# Patient Record
Sex: MALE | Race: WHITE
[De-identification: names, ages, dates, MRNs, and addresses within clinical notes are randomized per-mention and may not be internally consistent; named-entity substitution may affect disease eponyms.]

---

## 2020-08-19 ENCOUNTER — HOSPITAL ENCOUNTER (INPATIENT)
Dept: HOSPITAL 74 - YASAS | Age: 55
LOS: 5 days | Discharge: HOME | End: 2020-08-24
Attending: ALLERGY & IMMUNOLOGY | Admitting: ALLERGY & IMMUNOLOGY
Payer: COMMERCIAL

## 2020-08-19 VITALS — BODY MASS INDEX: 22 KG/M2

## 2020-08-19 DIAGNOSIS — Z86.79: ICD-10-CM

## 2020-08-19 DIAGNOSIS — F19.282: ICD-10-CM

## 2020-08-19 DIAGNOSIS — F41.9: ICD-10-CM

## 2020-08-19 DIAGNOSIS — G89.29: ICD-10-CM

## 2020-08-19 DIAGNOSIS — Z87.442: ICD-10-CM

## 2020-08-19 DIAGNOSIS — Z98.890: ICD-10-CM

## 2020-08-19 DIAGNOSIS — F17.213: ICD-10-CM

## 2020-08-19 DIAGNOSIS — M54.89: ICD-10-CM

## 2020-08-19 DIAGNOSIS — F32.9: ICD-10-CM

## 2020-08-19 DIAGNOSIS — F14.20: ICD-10-CM

## 2020-08-19 DIAGNOSIS — F10.230: Primary | ICD-10-CM

## 2020-08-19 DIAGNOSIS — F19.24: ICD-10-CM

## 2020-08-19 PROCEDURE — HZ2ZZZZ DETOXIFICATION SERVICES FOR SUBSTANCE ABUSE TREATMENT: ICD-10-PCS | Performed by: ALLERGY & IMMUNOLOGY

## 2020-08-19 PROCEDURE — U0003 INFECTIOUS AGENT DETECTION BY NUCLEIC ACID (DNA OR RNA); SEVERE ACUTE RESPIRATORY SYNDROME CORONAVIRUS 2 (SARS-COV-2) (CORONAVIRUS DISEASE [COVID-19]), AMPLIFIED PROBE TECHNIQUE, MAKING USE OF HIGH THROUGHPUT TECHNOLOGIES AS DESCRIBED BY CMS-2020-01-R: HCPCS

## 2020-08-19 RX ADMIN — Medication SCH MG: at 22:22

## 2020-08-19 RX ADMIN — HYDROXYZINE PAMOATE SCH MG: 25 CAPSULE ORAL at 17:45

## 2020-08-19 RX ADMIN — NICOTINE SCH MG: 14 PATCH, EXTENDED RELEASE TRANSDERMAL at 17:39

## 2020-08-19 RX ADMIN — HYDROXYZINE PAMOATE SCH MG: 25 CAPSULE ORAL at 22:22
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
## 2020-08-20 LAB
ALBUMIN SERPL-MCNC: 3.9 G/DL (ref 3.4–5)
ALP SERPL-CCNC: 72 U/L (ref 45–117)
ALT SERPL-CCNC: 86 U/L (ref 13–61)
ANION GAP SERPL CALC-SCNC: 9 MMOL/L (ref 8–16)
AST SERPL-CCNC: 73 U/L (ref 15–37)
BILIRUB SERPL-MCNC: 1.3 MG/DL (ref 0.2–1)
BUN SERPL-MCNC: 10.7 MG/DL (ref 7–18)
CALCIUM SERPL-MCNC: 9.3 MG/DL (ref 8.5–10.1)
CHLORIDE SERPL-SCNC: 102 MMOL/L (ref 98–107)
CO2 SERPL-SCNC: 27 MMOL/L (ref 21–32)
CREAT SERPL-MCNC: 0.8 MG/DL (ref 0.55–1.3)
DEPRECATED RDW RBC AUTO: 13.2 % (ref 11.9–15.9)
GLUCOSE SERPL-MCNC: 121 MG/DL (ref 74–106)
HCT VFR BLD CALC: 41.7 % (ref 35.4–49)
HGB BLD-MCNC: 14.1 GM/DL (ref 11.7–16.9)
MCH RBC QN AUTO: 33.2 PG (ref 25.7–33.7)
MCHC RBC AUTO-ENTMCNC: 34 G/DL (ref 32–35.9)
MCV RBC: 97.7 FL (ref 80–96)
PLATELET # BLD AUTO: 116 K/MM3 (ref 134–434)
PMV BLD: 8.7 FL (ref 7.5–11.1)
POTASSIUM SERPLBLD-SCNC: 3.6 MMOL/L (ref 3.5–5.1)
PROT SERPL-MCNC: 7.5 G/DL (ref 6.4–8.2)
RBC # BLD AUTO: 4.26 M/MM3 (ref 4–5.6)
SODIUM SERPL-SCNC: 138 MMOL/L (ref 136–145)
WBC # BLD AUTO: 5.6 K/MM3 (ref 4–10)

## 2020-08-20 RX ADMIN — Medication SCH MG: at 22:14

## 2020-08-20 RX ADMIN — HYDROXYZINE PAMOATE SCH MG: 25 CAPSULE ORAL at 06:17

## 2020-08-20 RX ADMIN — NICOTINE SCH MG: 14 PATCH, EXTENDED RELEASE TRANSDERMAL at 10:07

## 2020-08-20 RX ADMIN — TRAZODONE HYDROCHLORIDE SCH MG: 100 TABLET ORAL at 22:14

## 2020-08-20 RX ADMIN — HYDROXYZINE PAMOATE SCH: 25 CAPSULE ORAL at 13:24

## 2020-08-20 RX ADMIN — HYDROXYZINE PAMOATE SCH MG: 25 CAPSULE ORAL at 10:06

## 2020-08-20 RX ADMIN — Medication SCH TAB: at 10:06

## 2020-08-20 RX ADMIN — HYDROXYZINE PAMOATE SCH MG: 25 CAPSULE ORAL at 17:38

## 2020-08-20 RX ADMIN — HYDROXYZINE PAMOATE SCH MG: 25 CAPSULE ORAL at 22:14

## 2020-08-20 NOTE — PN
S CIWA





- CIWA Score


Nausea/Vomitin-Mild Nausea/No Vomiting


Muscle Tremors: 3


Anxiety: 2


Agitation: 1-Slight > Activity


Paroxysmal Sweats: 1-Minimal Palms Moist


Orientation: 0-Oriented


Tacttile Disturbances: 0-None


Auditory Disturbances: 0-None


Visual Disturbances: 2-Mild Sensitivity


Headache: 0-None Present


CIWA-Ar Total Score: 10





[facility] Progress Note (SOAP)


Subjective: 





54 years old male was admitted on 20 for alcohol withdrawal sx management


treating with librium detox regiment





feels tired ate 30% of breakfast and 40% lunch


bmi 22


ensure 120 ml po tid with meals


Objective: 





20 12:52


                               Vital Signs - 24 hr











  20





  14:57 17:28 20:33


 


Temperature 97.7 F 97.3 F L 97.3 F L


 


Pulse Rate 84 81 84


 


Respiratory 19 18 18





Rate   


 


Blood Pressure 141/88 161/86 139/91


 


O2 Sat by Pulse   100





Oximetry (%)   














  20





  06:43 08:31


 


Temperature 97.1 F L 98.4 F


 


Pulse Rate 104 H 82


 


Respiratory 16 18





Rate  


 


Blood Pressure 119/83 118/80


 


O2 Sat by Pulse 97 





Oximetry (%)  








                                Laboratory Tests











  20





  08:00 08:00 08:00


 


WBC   5.6 


 


RBC   4.26 


 


Hgb   14.1 


 


Hct   41.7 


 


MCV   97.7 H 


 


MCH   33.2 


 


MCHC   34.0 


 


RDW   13.2 


 


Plt Count   116 L 


 


MPV   8.7 


 


Sodium    138


 


Potassium    3.6


 


Chloride    102


 


Carbon Dioxide    27


 


Anion Gap    9


 


BUN    10.7


 


Creatinine    0.8


 


Est GFR (CKD-EPI)AfAm    118.20


 


Est GFR (CKD-EPI)NonAf    101.99


 


Random Glucose    121 H


 


Calcium    9.3


 


Total Bilirubin    1.3 H


 


AST    73 H


 


ALT    86 H


 


Alkaline Phosphatase    72


 


Total Protein    7.5


 


Albumin    3.9


 


Syphilis Serology  Non-reactive  











20 12:56


glucose elevation 


fasting glucose pending


Assessment: 





20 12:56


alcohol withdrawal 


Plan: 





librium regiment
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
## 2020-08-20 NOTE — EKG
Test Reason : 

Blood Pressure : ***/*** mmHG

Vent. Rate : 073 BPM     Atrial Rate : 073 BPM

   P-R Int : 162 ms          QRS Dur : 084 ms

    QT Int : 378 ms       P-R-T Axes : 067 042 051 degrees

   QTc Int : 416 ms

 

NORMAL SINUS RHYTHM

POSSIBLE LEFT ATRIAL ENLARGEMENT

BORDERLINE ECG

NO PREVIOUS ECGS AVAILABLE

Confirmed by GERALD NUÑEZ, LISSA ([id number]) on 8/20/2020 4:58:09 PM

 

Referred By:             Confirmed By:LISSA DUARTE MD

## 2020-08-21 RX ADMIN — HYDROXYZINE PAMOATE SCH MG: 25 CAPSULE ORAL at 05:57

## 2020-08-21 RX ADMIN — HYDROXYZINE PAMOATE SCH: 25 CAPSULE ORAL at 14:28

## 2020-08-21 RX ADMIN — TRAZODONE HYDROCHLORIDE SCH MG: 100 TABLET ORAL at 22:19

## 2020-08-21 RX ADMIN — Medication SCH MG: at 22:19

## 2020-08-21 RX ADMIN — HYDROXYZINE PAMOATE SCH MG: 25 CAPSULE ORAL at 10:53

## 2020-08-21 RX ADMIN — HYDROXYZINE PAMOATE SCH MG: 25 CAPSULE ORAL at 22:19

## 2020-08-21 RX ADMIN — NICOTINE SCH MG: 14 PATCH, EXTENDED RELEASE TRANSDERMAL at 10:55

## 2020-08-21 RX ADMIN — HYDROXYZINE PAMOATE SCH MG: 25 CAPSULE ORAL at 17:51

## 2020-08-21 RX ADMIN — Medication SCH TAB: at 10:54

## 2020-08-22 RX ADMIN — HYDROXYZINE PAMOATE SCH MG: 25 CAPSULE ORAL at 05:44

## 2020-08-22 RX ADMIN — HYDROXYZINE PAMOATE SCH MG: 25 CAPSULE ORAL at 10:11

## 2020-08-22 RX ADMIN — Medication SCH TAB: at 10:10

## 2020-08-22 RX ADMIN — TRAZODONE HYDROCHLORIDE SCH MG: 100 TABLET ORAL at 22:28

## 2020-08-22 RX ADMIN — HYDROXYZINE PAMOATE SCH MG: 25 CAPSULE ORAL at 18:59

## 2020-08-22 RX ADMIN — NICOTINE SCH: 14 PATCH, EXTENDED RELEASE TRANSDERMAL at 10:12

## 2020-08-22 RX ADMIN — HYDROXYZINE PAMOATE SCH MG: 25 CAPSULE ORAL at 22:28

## 2020-08-22 RX ADMIN — Medication SCH MG: at 22:28

## 2020-08-22 RX ADMIN — HYDROXYZINE PAMOATE SCH: 25 CAPSULE ORAL at 14:52

## 2020-08-22 NOTE — PN
S CIWA





- CIWA Score


Nausea/Vomitin-No Nausea/No Vomiting


Muscle Tremors: None


Anxiety: 2


Agitation: 0-Normal Activity


Paroxysmal Sweats: 2


Orientation: 0-Oriented


Tacttile Disturbances: 0-None


Auditory Disturbances: 0-None


Visual Disturbances: 0-None


Headache: 2-Mild


CIWA-Ar Total Score: 6





[facility] Progress Note (SOAP)


Subjective: 





c/o headache, anxiety, and headache.


Objective: 





20 12:40


                                   Vital Signs











  20





  06:45 08:34


 


Temperature 98.1 F 97.3 F L


 


Pulse Rate 61 88


 


Respiratory 18 18





Rate  


 


Blood Pressure 98/58 L 98/62


 


O2 Sat by Pulse 99 





Oximetry (%)  








                             Laboratory Last Values











WBC  5.6 K/mm3 (4.0-10.0)   20  08:00    


 


RBC  4.26 M/mm3 (4.00-5.60)   20  08:00    


 


Hgb  14.1 GM/dL (11.7-16.9)   20  08:00    


 


Hct  41.7 % (35.4-49)   20  08:00    


 


MCV  97.7 fl (80-96)  H  20  08:00    


 


MCH  33.2 pg (25.7-33.7)   20  08:00    


 


MCHC  34.0 g/dl (32.0-35.9)   20  08:00    


 


RDW  13.2 % (11.9-15.9)   20  08:00    


 


Plt Count  116 K/MM3 (134-434)  L  20  08:00    


 


MPV  8.7 fl (7.5-11.1)   20  08:00    


 


Sodium  138 mmol/L (136-145)   20  08:00    


 


Potassium  3.6 mmol/L (3.5-5.1)   20  08:00    


 


Chloride  102 mmol/L ()   20  08:00    


 


Carbon Dioxide  27 mmol/L (21-32)   20  08:00    


 


Anion Gap  9 MMOL/L (8-16)   20  08:00    


 


BUN  10.7 mg/dL (7-18)   20  08:00    


 


Creatinine  0.8 mg/dL (0.55-1.3)   20  08:00    


 


Est GFR (CKD-EPI)AfAm  118.20   20  08:00    


 


Est GFR (CKD-EPI)NonAf  101.99   20  08:00    


 


Random Glucose  121 mg/dL ()  H  20  08:00    


 


Calcium  9.3 mg/dL (8.5-10.1)   20  08:00    


 


Total Bilirubin  1.3 mg/dL (0.2-1)  H  20  08:00    


 


AST  73 U/L (15-37)  H  20  08:00    


 


ALT  86 U/L (13-61)  H  20  08:00    


 


Alkaline Phosphatase  72 U/L ()   20  08:00    


 


Total Protein  7.5 g/dl (6.4-8.2)   20  08:00    


 


Albumin  3.9 g/dl (3.4-5.0)   20  08:00    


 


Syphilis Serology  Non-reactive  (NONREACTIVE)   20  08:00    


 


COVID-19 (JONATHAN)  Not detected  (Not Detected)   20  08:40    








Labs noted.


Assessment: 





20 12:40


AOX3, in no acute respiratory distress.


Full ROM, ambulating in the unit.


Withdrawal symptoms.


Plan: 





continue detox.

## 2020-08-23 RX ADMIN — Medication SCH MG: at 22:04

## 2020-08-23 RX ADMIN — HYDROXYZINE PAMOATE SCH MG: 25 CAPSULE ORAL at 17:22

## 2020-08-23 RX ADMIN — HYDROXYZINE PAMOATE SCH: 25 CAPSULE ORAL at 13:35

## 2020-08-23 RX ADMIN — Medication SCH MG: at 22:05

## 2020-08-23 RX ADMIN — TRAZODONE HYDROCHLORIDE SCH MG: 100 TABLET ORAL at 22:04

## 2020-08-23 RX ADMIN — Medication SCH TAB: at 10:34

## 2020-08-23 RX ADMIN — HYDROXYZINE PAMOATE SCH MG: 25 CAPSULE ORAL at 10:34

## 2020-08-23 RX ADMIN — NICOTINE SCH: 14 PATCH, EXTENDED RELEASE TRANSDERMAL at 10:35

## 2020-08-23 RX ADMIN — HYDROXYZINE PAMOATE SCH MG: 25 CAPSULE ORAL at 22:04

## 2020-08-23 RX ADMIN — HYDROXYZINE PAMOATE SCH MG: 25 CAPSULE ORAL at 06:04

## 2020-08-23 NOTE — PN
S CIWA





- CIWA Score


Nausea/Vomitin-No Nausea/No Vomiting


Muscle Tremors: 1-None Visible, but Felt


Anxiety: 1-Mildly Anxious


Agitation: 0-Normal Activity


Paroxysmal Sweats: No Perspiration


Orientation: 0-Oriented


Tacttile Disturbances: 1-Very Mild Itch/Numbness


Auditory Disturbances: 0-None


Visual Disturbances: 0-None


Headache: 0-None Present


CIWA-Ar Total Score: 3





[facility] Progress Note (SOAP)


Subjective: 





54 years old male was admitted on 20 for alcohol withdrawal sx management


treating with librium detox regiment


feels better today discussing aftercare with staff 


mr dudley prefers [facility] is ok for alcohol abuse treatment


Objective: 





20 13:52


                               Vital Signs - 24 hr











  20





  16:58 20:33 05:57


 


Temperature 98.6 F 97.5 F L 97.5 F L


 


Pulse Rate 80 81 86


 


Respiratory 18 18 16





Rate   


 


Blood Pressure 110/78 124/84 100/66


 


O2 Sat by Pulse  100 98





Oximetry (%)   














  20





  08:48 12:55


 


Temperature 96.9 F L 98.1 F


 


Pulse Rate 68 68


 


Respiratory 18 18





Rate  


 


Blood Pressure 121/73 110/74


 


O2 Sat by Pulse 98 96





Oximetry (%)  








                                Laboratory Tests











  20





  08:00 08:00 08:00


 


WBC   5.6 


 


RBC   4.26 


 


Hgb   14.1 


 


Hct   41.7 


 


MCV   97.7 H 


 


MCH   33.2 


 


MCHC   34.0 


 


RDW   13.2 


 


Plt Count   116 L 


 


MPV   8.7 


 


Sodium    138


 


Potassium    3.6


 


Chloride    102


 


Carbon Dioxide    27


 


Anion Gap    9


 


BUN    10.7


 


Creatinine    0.8


 


Est GFR (CKD-EPI)AfAm    118.20


 


Est GFR (CKD-EPI)NonAf    101.99


 


Random Glucose    121 H


 


Fasting Glucose   


 


Calcium    9.3


 


Total Bilirubin    1.3 H


 


AST    73 H


 


ALT    86 H


 


Alkaline Phosphatase    72


 


Total Protein    7.5


 


Albumin    3.9


 


Syphilis Serology  Non-reactive  


 


COVID-19 (JONATHAN)   














  20





  08:40 07:15


 


WBC  


 


RBC  


 


Hgb  


 


Hct  


 


MCV  


 


MCH  


 


MCHC  


 


RDW  


 


Plt Count  


 


MPV  


 


Sodium  


 


Potassium  


 


Chloride  


 


Carbon Dioxide  


 


Anion Gap  


 


BUN  


 


Creatinine  


 


Est GFR (CKD-EPI)AfAm  


 


Est GFR (CKD-EPI)NonAf  


 


Random Glucose  


 


Fasting Glucose   167 H


 


Calcium  


 


Total Bilirubin  


 


AST  


 


ALT  


 


Alkaline Phosphatase  


 


Total Protein  


 


Albumin  


 


Syphilis Serology  


 


COVID-19 (JONATHAN)  Not detected 





glucose serum elevation 


encourage mr dudley for hgba1c following up with aftercare 





20 13:55





Assessment: 





20 13:56


alcohol withdrawal 


Plan: 





librium regiment

## 2020-08-24 VITALS — HEART RATE: 83 BPM | DIASTOLIC BLOOD PRESSURE: 79 MMHG | SYSTOLIC BLOOD PRESSURE: 126 MMHG

## 2020-08-24 VITALS — TEMPERATURE: 98.2 F

## 2020-08-24 RX ADMIN — HYDROXYZINE PAMOATE SCH MG: 25 CAPSULE ORAL at 06:22

## 2020-08-24 RX ADMIN — HYDROXYZINE PAMOATE SCH: 25 CAPSULE ORAL at 09:32

## 2020-08-24 RX ADMIN — NICOTINE SCH: 14 PATCH, EXTENDED RELEASE TRANSDERMAL at 09:31

## 2020-08-24 RX ADMIN — Medication SCH: at 09:31

## 2020-08-24 NOTE — DS
[facility] Detox Discharge Summary


Admission Date: 


20





Discharge Date: 20





- History


Present History: Alcohol Dependence


Additional Comments: 





54 years old male was admitted on 20 for alcohol withdrawal sx management


treated with librium detox regiment


seen by psychiatrist urvashi martinez


mr dudley has completed librium regiment and is tolerated well 





General Appearance: Yes: no Distress, mild Tremorous, less Anxious


HEENTM: Yes: Hearing grossly Normal, Normocephalic, AMBER


Respiratory: Yes: Lungs Clear, Normal Breath Sounds, No Respiratory Distress, No

Accessory Muscle Use


Neck: Yes: Supple


Cardiology: Yes: Regular Rhythm, Regular Rate, S1, S2


Abdominal: Yes: Normal Bowel Sounds, Non Tender, Flat, Soft


Extremities: Yes: Within Normal Limits, Normal Range of Motion


Neurological: Yes: Within Normal Limits, Alert, Motor Strength 5/5, Normal 

Response


Integumentary: Yes: Dry, Warm


Pertinent Past History: 





time for discharge 34 minutes





- Physical Exam Results


Vital Signs: 


                                   Vital Signs











Temperature  98.2 F   20 08:32


 


Pulse Rate  83   20 08:32


 


Respiratory Rate  18   20 08:32


 


Blood Pressure  126/79   20 08:32


 


O2 Sat by Pulse Oximetry (%)  96   20 05:47











Pertinent Admission Physical Exam Findings: 





alcohol withdrawal 


                               Vital Signs - 24 hr











  20





  12:55 16:28 20:18


 


Temperature 98.1 F 97.1 F L 97.5 F L


 


Pulse Rate 68 91 H 86


 


Respiratory 18 18 19





Rate   


 


Blood Pressure 110/74 128/78 136/83


 


O2 Sat by Pulse 96  98





Oximetry (%)   














  20





  05:47 08:32


 


Temperature 98.2 F 98.2 F


 


Pulse Rate 73 83


 


Respiratory 16 18





Rate  


 


Blood Pressure 114/73 126/79


 


O2 Sat by Pulse 96 





Oximetry (%)  








                                Laboratory Tests











  20





  08:00 08:00 08:00


 


WBC   5.6 


 


RBC   4.26 


 


Hgb   14.1 


 


Hct   41.7 


 


MCV   97.7 H 


 


MCH   33.2 


 


MCHC   34.0 


 


RDW   13.2 


 


Plt Count   116 L 


 


MPV   8.7 


 


Sodium    138


 


Potassium    3.6


 


Chloride    102


 


Carbon Dioxide    27


 


Anion Gap    9


 


BUN    10.7


 


Creatinine    0.8


 


Est GFR (CKD-EPI)AfAm    118.20


 


Est GFR (CKD-EPI)NonAf    101.99


 


Random Glucose    121 H


 


Fasting Glucose   


 


Calcium    9.3


 


Total Bilirubin    1.3 H


 


AST    73 H


 


ALT    86 H


 


Alkaline Phosphatase    72


 


Total Protein    7.5


 


Albumin    3.9


 


Syphilis Serology  Non-reactive  


 


COVID-19 (JONATHAN)   














  20





  08:40 07:15


 


WBC  


 


RBC  


 


Hgb  


 


Hct  


 


MCV  


 


MCH  


 


MCHC  


 


RDW  


 


Plt Count  


 


MPV  


 


Sodium  


 


Potassium  


 


Chloride  


 


Carbon Dioxide  


 


Anion Gap  


 


BUN  


 


Creatinine  


 


Est GFR (CKD-EPI)AfAm  


 


Est GFR (CKD-EPI)NonAf  


 


Random Glucose  


 


Fasting Glucose   167 H


 


Calcium  


 


Total Bilirubin  


 


AST  


 


ALT  


 


Alkaline Phosphatase  


 


Total Protein  


 


Albumin  


 


Syphilis Serology  


 


COVID-19 (JONATHAN)  Not detected 








encourage mr dudley to [facility]





- [facility]


Hospital Course: Detox Protocol Followed, Detoxed Safely, Responded well, 

Discharged Condition Good, Rehab Referral Accepted


Patient has Accepted a Rehab Referral to: [facility]





- Medication


Discharge Medications: 


Ambulatory Orders





Folic Acid - 1 mg PO DAILY 20 


Gabapentin [Neurontin -] 100 mg PO BID 20 


Hydroxyzine HCl 25 mg PO TID 20 


Naltrexone 50 mg PO DAILY 20 


traZODone HCL [Desyrel -] 100 mg PO HS 20 











- Diagnosis


(1) Substance induced mood disorder


Status: Suspected   





(2) Alcohol dependence with withdrawal, uncomplicated


Status: Acute   





(3) Nicotine dependence


Status: Acute   


Qualifiers: 


   Nicotine product type: cigarettes   Substance use status: in withdrawal   

Qualified Code(s): F17.213 - Nicotine dependence, cigarettes, with withdrawal   





- AMA


Did Patient Leave Against Medical Advice: No





CIWA Score





- CIWA Score


Nausea/Vomitin-No Nausea/No Vomiting


Muscle Tremors: 1-None Visible, but Felt


Anxiety: 0-No Anxiety, at Ease


Agitation: 0-Normal Activity


Paroxysmal Sweats: No Perspiration


Orientation: 0-Oriented


Tacttile Disturbances: 0-None


Auditory Disturbances: 0-None


Visual Disturbances: 0-None


Headache: 0-None Present


CIWA-Ar Total Score: 1